# Patient Record
Sex: FEMALE | Race: WHITE | URBAN - METROPOLITAN AREA
[De-identification: names, ages, dates, MRNs, and addresses within clinical notes are randomized per-mention and may not be internally consistent; named-entity substitution may affect disease eponyms.]

---

## 2019-10-28 ENCOUNTER — EMERGENCY (EMERGENCY)
Facility: HOSPITAL | Age: 39
LOS: 0 days | Discharge: HOME | End: 2019-10-28
Attending: EMERGENCY MEDICINE | Admitting: EMERGENCY MEDICINE
Payer: MEDICAID

## 2019-10-28 VITALS
OXYGEN SATURATION: 98 % | DIASTOLIC BLOOD PRESSURE: 62 MMHG | TEMPERATURE: 99 F | HEART RATE: 84 BPM | RESPIRATION RATE: 18 BRPM | SYSTOLIC BLOOD PRESSURE: 111 MMHG

## 2019-10-28 VITALS
HEIGHT: 64 IN | HEART RATE: 89 BPM | SYSTOLIC BLOOD PRESSURE: 116 MMHG | TEMPERATURE: 100 F | WEIGHT: 229.94 LBS | RESPIRATION RATE: 20 BRPM | DIASTOLIC BLOOD PRESSURE: 61 MMHG | OXYGEN SATURATION: 97 %

## 2019-10-28 DIAGNOSIS — R05 COUGH: ICD-10-CM

## 2019-10-28 DIAGNOSIS — B34.9 VIRAL INFECTION, UNSPECIFIED: ICD-10-CM

## 2019-10-28 LAB
ALBUMIN SERPL ELPH-MCNC: 4.3 G/DL — SIGNIFICANT CHANGE UP (ref 3.5–5.2)
ALP SERPL-CCNC: 120 U/L — HIGH (ref 30–115)
ALT FLD-CCNC: 25 U/L — SIGNIFICANT CHANGE UP (ref 0–41)
ANION GAP SERPL CALC-SCNC: 14 MMOL/L — SIGNIFICANT CHANGE UP (ref 7–14)
AST SERPL-CCNC: 15 U/L — SIGNIFICANT CHANGE UP (ref 0–41)
BASOPHILS # BLD AUTO: 0.04 K/UL — SIGNIFICANT CHANGE UP (ref 0–0.2)
BASOPHILS NFR BLD AUTO: 0.3 % — SIGNIFICANT CHANGE UP (ref 0–1)
BILIRUB SERPL-MCNC: 2 MG/DL — HIGH (ref 0.2–1.2)
BUN SERPL-MCNC: 15 MG/DL — SIGNIFICANT CHANGE UP (ref 10–20)
CALCIUM SERPL-MCNC: 9.2 MG/DL — SIGNIFICANT CHANGE UP (ref 8.5–10.1)
CHLORIDE SERPL-SCNC: 99 MMOL/L — SIGNIFICANT CHANGE UP (ref 98–110)
CO2 SERPL-SCNC: 26 MMOL/L — SIGNIFICANT CHANGE UP (ref 17–32)
CREAT SERPL-MCNC: 0.9 MG/DL — SIGNIFICANT CHANGE UP (ref 0.7–1.5)
EOSINOPHIL # BLD AUTO: 0.01 K/UL — SIGNIFICANT CHANGE UP (ref 0–0.7)
EOSINOPHIL NFR BLD AUTO: 0.1 % — SIGNIFICANT CHANGE UP (ref 0–8)
GLUCOSE SERPL-MCNC: 131 MG/DL — HIGH (ref 70–99)
HCT VFR BLD CALC: 43.7 % — SIGNIFICANT CHANGE UP (ref 37–47)
HGB BLD-MCNC: 14.8 G/DL — SIGNIFICANT CHANGE UP (ref 12–16)
IMM GRANULOCYTES NFR BLD AUTO: 0.6 % — HIGH (ref 0.1–0.3)
LYMPHOCYTES # BLD AUTO: 1.35 K/UL — SIGNIFICANT CHANGE UP (ref 1.2–3.4)
LYMPHOCYTES # BLD AUTO: 9.2 % — LOW (ref 20.5–51.1)
MCHC RBC-ENTMCNC: 29.6 PG — SIGNIFICANT CHANGE UP (ref 27–31)
MCHC RBC-ENTMCNC: 33.9 G/DL — SIGNIFICANT CHANGE UP (ref 32–37)
MCV RBC AUTO: 87.4 FL — SIGNIFICANT CHANGE UP (ref 81–99)
MONOCYTES # BLD AUTO: 1.4 K/UL — HIGH (ref 0.1–0.6)
MONOCYTES NFR BLD AUTO: 9.6 % — HIGH (ref 1.7–9.3)
NEUTROPHILS # BLD AUTO: 11.74 K/UL — HIGH (ref 1.4–6.5)
NEUTROPHILS NFR BLD AUTO: 80.2 % — HIGH (ref 42.2–75.2)
NRBC # BLD: 0 /100 WBCS — SIGNIFICANT CHANGE UP (ref 0–0)
PLATELET # BLD AUTO: 179 K/UL — SIGNIFICANT CHANGE UP (ref 130–400)
POTASSIUM SERPL-MCNC: 4 MMOL/L — SIGNIFICANT CHANGE UP (ref 3.5–5)
POTASSIUM SERPL-SCNC: 4 MMOL/L — SIGNIFICANT CHANGE UP (ref 3.5–5)
PROT SERPL-MCNC: 7.5 G/DL — SIGNIFICANT CHANGE UP (ref 6–8)
RBC # BLD: 5 M/UL — SIGNIFICANT CHANGE UP (ref 4.2–5.4)
RBC # FLD: 12.1 % — SIGNIFICANT CHANGE UP (ref 11.5–14.5)
SODIUM SERPL-SCNC: 139 MMOL/L — SIGNIFICANT CHANGE UP (ref 135–146)
WBC # BLD: 14.63 K/UL — HIGH (ref 4.8–10.8)
WBC # FLD AUTO: 14.63 K/UL — HIGH (ref 4.8–10.8)

## 2019-10-28 PROCEDURE — 99284 EMERGENCY DEPT VISIT MOD MDM: CPT

## 2019-10-28 PROCEDURE — 71046 X-RAY EXAM CHEST 2 VIEWS: CPT | Mod: 26

## 2019-10-28 RX ORDER — SODIUM CHLORIDE 9 MG/ML
1000 INJECTION, SOLUTION INTRAVENOUS ONCE
Refills: 0 | Status: COMPLETED | OUTPATIENT
Start: 2019-10-28 | End: 2019-10-28

## 2019-10-28 RX ORDER — KETOROLAC TROMETHAMINE 30 MG/ML
15 SYRINGE (ML) INJECTION ONCE
Refills: 0 | Status: DISCONTINUED | OUTPATIENT
Start: 2019-10-28 | End: 2019-10-28

## 2019-10-28 RX ADMIN — SODIUM CHLORIDE 1000 MILLILITER(S): 9 INJECTION, SOLUTION INTRAVENOUS at 09:28

## 2019-10-28 RX ADMIN — Medication 15 MILLIGRAM(S): at 09:27

## 2019-10-28 NOTE — ED PROVIDER NOTE - ATTENDING CONTRIBUTION TO CARE
cough and fever.  VS noted.  Chest clear.  Heart RR no murmur.  abd NT.  Ext FROM.  =pulses. dx testing reviewed.

## 2019-10-28 NOTE — ED PROVIDER NOTE - NS ED ROS FT
Constitutional: (-) fever, (+) chills, (+) myalgias   Eyes: (-) visual changes  ENT: (-) nasal congestions  Cardiovascular: (-) chest pain, (-) syncope  Respiratory: (+) cough, (-) shortness of breath, (-) dyspnea,   Gastrointestinal: (-) vomiting, (-) diarrhea, (-)nausea,  Musculoskeletal: (-) neck pain, (-) back pain, (-) joint pain,  Integumentary: (-) rash, (-) edema, (-) bruises  Neurological: (-) headache, (-) loc, (-) dizziness, (-) tingling, (-)numbness,  Peripheral Vascular: (-) leg swelling  :  (-)dysuria,  (-) hematuria  Allergic/Immunologic: (-) pruritus

## 2019-10-28 NOTE — ED PROVIDER NOTE - PATIENT PORTAL LINK FT
You can access the FollowMyHealth Patient Portal offered by [facility] by registering at the following website: [email or web address]. By joining [employer]’s FollowMyHealth portal, you will also be able to view your health information using other applications (apps) compatible with our system.

## 2019-10-28 NOTE — ED PROVIDER NOTE - OBJECTIVE STATEMENT
38 yo female, no pmh, presents to ed for cough, chills, and diffuse muscle aches. Started 3 days ago, no otc meds for pain, mild, aching, no radiation of pain. Denies fever, cp, sob, dysuria, hematuria, neck pain, visual changes, ha.

## 2019-10-28 NOTE — ED PROVIDER NOTE - PHYSICAL EXAMINATION
Physical Exam    Vital Signs: I have reviewed the initial vital signs.  Constitutional: well-nourished, appears stated age, no acute distress  Eyes: Conjunctiva pink, Sclera clear,  Cardiovascular: S1 and S2, regular rate, regular rhythm, well-perfused extremities, radial pulses equal and 2+  Respiratory: unlabored respiratory effort, clear to auscultation bilaterally no wheezing, rales and rhonchi  Gastrointestinal: soft, non-tender abdomen, no pulsatile mass, normal bowl sounds  Musculoskeletal: supple neck, no lower extremity edema, no midline tenderness  Integumentary: warm, dry, no rash  Neurologic: awake, alert, nvi,  extremities’ motor and sensory functions grossly intact

## 2019-10-28 NOTE — ED PROVIDER NOTE - NSFOLLOWUPINSTRUCTIONS_ED_ALL_ED_FT
Viral Illness, Adult  Viruses are tiny germs that can get into a person's body and cause illness. There are many different types of viruses, and they cause many types of illness. Viral illnesses can range from mild to severe. They can affect various parts of the body.    Common illnesses that are caused by a virus include colds and the flu. Viral illnesses also include serious conditions such as HIV/AIDS (human immunodeficiency virus/acquired immunodeficiency syndrome). A few viruses have been linked to certain cancers.    What are the causes?  Many types of viruses can cause illness. Viruses invade cells in your body, multiply, and cause the infected cells to malfunction or die. When the cell dies, it releases more of the virus. When this happens, you develop symptoms of the illness, and the virus continues to spread to other cells. If the virus takes over the function of the cell, it can cause the cell to divide and grow out of control, as is the case when a virus causes cancer.    Different viruses get into the body in different ways. You can get a virus by:    Swallowing food or water that is contaminated with the virus.  Breathing in droplets that have been coughed or sneezed into the air by an infected person.  Touching a surface that has been contaminated with the virus and then touching your eyes, nose, or mouth.  Being bitten by an insect or animal that carries the virus.  Having sexual contact with a person who is infected with the virus.  Being exposed to blood or fluids that contain the virus, either through an open cut or during a transfusion.    If a virus enters your body, your body's defense system (immune system) will try to fight the virus. You may be at higher risk for a viral illness if your immune system is weak.    What are the signs or symptoms?  Symptoms vary depending on the type of virus and the location of the cells that it invades. Common symptoms of the main types of viral illnesses include:    Cold and flu viruses     Fever.  Headache.  Sore throat.  Muscle aches.  Nasal congestion.  Cough.  Digestive system (gastrointestinal) viruses     Fever.  Abdominal pain.  Nausea.  Diarrhea.  Liver viruses (hepatitis)     Loss of appetite.  Tiredness.  Yellowing of the skin (jaundice).  Brain and spinal cord viruses     Fever.  Headache.  Stiff neck.  Nausea and vomiting.  Confusion or sleepiness.  Skin viruses     Warts.  Itching.  Rash.  Sexually transmitted viruses     Discharge.  Swelling.  Redness.  Rash.  How is this treated?  Viruses can be difficult to treat because they live within cells. Antibiotic medicines do not treat viruses because these drugs do not get inside cells. Treatment for a viral illness may include:    Resting and drinking plenty of fluids.  Medicines to relieve symptoms. These can include over-the-counter medicine for pain and fever, medicines for cough or congestion, and medicines to relieve diarrhea.  Antiviral medicines. These drugs are available only for certain types of viruses. They may help reduce flu symptoms if taken early. There are also many antiviral medicines for hepatitis and HIV/AIDS.    Some viral illnesses can be prevented with vaccinations. A common example is the flu shot.    Follow these instructions at home:  Medicines     Image   Take over-the-counter and prescription medicines only as told by your health care provider.  If you were prescribed an antiviral medicine, take it as told by your health care provider. Do not stop taking the medicine even if you start to feel better.  Be aware of when antibiotics are needed and when they are not needed. Antibiotics do not treat viruses. If your health care provider thinks that you may have a bacterial infection as well as a viral infection, you may get an antibiotic.    Do not ask for an antibiotic prescription if you have been diagnosed with a viral illness. That will not make your illness go away faster.  Frequently taking antibiotics when they are not needed can lead to antibiotic resistance. When this develops, the medicine no longer works against the bacteria that it normally fights.    General instructions     Drink enough fluids to keep your urine clear or pale yellow.  Rest as much as possible.  Return to your normal activities as told by your health care provider. Ask your health care provider what activities are safe for you.  Keep all follow-up visits as told by your health care provider. This is important.  How is this prevented?  ImageTake these actions to reduce your risk of viral infection:    Eat a healthy diet and get enough rest.  Wash your hands often with soap and water. This is especially important when you are in public places. If soap and water are not available, use hand .  Avoid close contact with friends and family who have a viral illness.  If you travel to areas where viral gastrointestinal infection is common, avoid drinking water or eating raw food.  Keep your immunizations up to date. Get a flu shot every year as told by your health care provider.  Do not share toothbrushes, nail clippers, razors, or needles with other people.  Always practice safe sex.    Contact a health care provider if:  You have symptoms of a viral illness that do not go away.  Your symptoms come back after going away.  Your symptoms get worse.  Get help right away if:  You have trouble breathing.  You have a severe headache or a stiff neck.  You have severe vomiting or abdominal pain.  This information is not intended to replace advice given to you by your health care provider. Make sure you discuss any questions you have with your health care provider.

## 2021-01-29 ENCOUNTER — OUTPATIENT (OUTPATIENT)
Dept: OUTPATIENT SERVICES | Facility: HOSPITAL | Age: 41
LOS: 1 days | Discharge: HOME | End: 2021-01-29
Payer: MEDICAID

## 2021-01-29 DIAGNOSIS — M79.641 PAIN IN RIGHT HAND: ICD-10-CM

## 2021-01-29 DIAGNOSIS — M25.561 PAIN IN RIGHT KNEE: ICD-10-CM

## 2021-01-29 DIAGNOSIS — M79.644 PAIN IN RIGHT FINGER(S): ICD-10-CM

## 2021-01-29 PROBLEM — Z78.9 OTHER SPECIFIED HEALTH STATUS: Chronic | Status: ACTIVE | Noted: 2019-10-28

## 2021-01-29 PROCEDURE — 73562 X-RAY EXAM OF KNEE 3: CPT | Mod: 26,RT

## 2021-01-29 PROCEDURE — 73130 X-RAY EXAM OF HAND: CPT | Mod: 26,RT

## 2021-12-15 ENCOUNTER — OUTPATIENT (OUTPATIENT)
Dept: OUTPATIENT SERVICES | Facility: HOSPITAL | Age: 41
LOS: 1 days | Discharge: HOME | End: 2021-12-15
Payer: MEDICAID

## 2021-12-15 DIAGNOSIS — M54.9 DORSALGIA, UNSPECIFIED: ICD-10-CM

## 2021-12-15 DIAGNOSIS — M25.50 PAIN IN UNSPECIFIED JOINT: ICD-10-CM

## 2021-12-15 PROCEDURE — 73090 X-RAY EXAM OF FOREARM: CPT | Mod: 26,LT

## 2021-12-15 PROCEDURE — 73552 X-RAY EXAM OF FEMUR 2/>: CPT | Mod: 26,LT

## 2021-12-15 PROCEDURE — 73502 X-RAY EXAM HIP UNI 2-3 VIEWS: CPT | Mod: 26,LT

## 2021-12-15 PROCEDURE — 73110 X-RAY EXAM OF WRIST: CPT | Mod: 26,LT

## 2021-12-15 PROCEDURE — 73060 X-RAY EXAM OF HUMERUS: CPT | Mod: 26,LT

## 2021-12-15 PROCEDURE — 73080 X-RAY EXAM OF ELBOW: CPT | Mod: 26,LT

## 2021-12-15 PROCEDURE — 72110 X-RAY EXAM L-2 SPINE 4/>VWS: CPT | Mod: 26

## 2022-10-04 ENCOUNTER — ASOB RESULT (OUTPATIENT)
Age: 42
End: 2022-10-04

## 2022-10-04 ENCOUNTER — APPOINTMENT (OUTPATIENT)
Dept: ANTEPARTUM | Facility: CLINIC | Age: 42
End: 2022-10-04

## 2022-10-04 VITALS
SYSTOLIC BLOOD PRESSURE: 130 MMHG | HEART RATE: 91 BPM | BODY MASS INDEX: 44.39 KG/M2 | WEIGHT: 260 LBS | HEIGHT: 64 IN | DIASTOLIC BLOOD PRESSURE: 80 MMHG

## 2022-10-04 PROCEDURE — 76813 OB US NUCHAL MEAS 1 GEST: CPT

## 2022-11-29 ENCOUNTER — ASOB RESULT (OUTPATIENT)
Age: 42
End: 2022-11-29

## 2022-11-29 ENCOUNTER — APPOINTMENT (OUTPATIENT)
Dept: ANTEPARTUM | Facility: CLINIC | Age: 42
End: 2022-11-29

## 2022-11-29 VITALS
HEART RATE: 86 BPM | SYSTOLIC BLOOD PRESSURE: 122 MMHG | DIASTOLIC BLOOD PRESSURE: 80 MMHG | WEIGHT: 265 LBS | BODY MASS INDEX: 45.24 KG/M2 | HEIGHT: 64 IN

## 2022-11-29 PROCEDURE — 76811 OB US DETAILED SNGL FETUS: CPT

## 2022-11-29 PROCEDURE — 76817 TRANSVAGINAL US OBSTETRIC: CPT

## 2023-02-02 ENCOUNTER — APPOINTMENT (OUTPATIENT)
Dept: ANTEPARTUM | Facility: CLINIC | Age: 43
End: 2023-02-02
Payer: MEDICAID

## 2023-02-02 ENCOUNTER — ASOB RESULT (OUTPATIENT)
Age: 43
End: 2023-02-02

## 2023-02-02 ENCOUNTER — NON-APPOINTMENT (OUTPATIENT)
Age: 43
End: 2023-02-02

## 2023-02-02 VITALS
DIASTOLIC BLOOD PRESSURE: 81 MMHG | HEART RATE: 103 BPM | BODY MASS INDEX: 46.44 KG/M2 | HEIGHT: 64 IN | SYSTOLIC BLOOD PRESSURE: 118 MMHG | WEIGHT: 272 LBS

## 2023-02-02 PROCEDURE — 76816 OB US FOLLOW-UP PER FETUS: CPT

## 2023-03-02 ENCOUNTER — APPOINTMENT (OUTPATIENT)
Dept: MATERNAL FETAL MEDICINE | Facility: CLINIC | Age: 43
End: 2023-03-02
Payer: MEDICAID

## 2023-03-02 ENCOUNTER — APPOINTMENT (OUTPATIENT)
Dept: ANTEPARTUM | Facility: CLINIC | Age: 43
End: 2023-03-02
Payer: MEDICAID

## 2023-03-02 ENCOUNTER — ASOB RESULT (OUTPATIENT)
Age: 43
End: 2023-03-02

## 2023-03-02 VITALS
DIASTOLIC BLOOD PRESSURE: 70 MMHG | HEIGHT: 64 IN | WEIGHT: 276 LBS | BODY MASS INDEX: 47.12 KG/M2 | HEART RATE: 98 BPM | SYSTOLIC BLOOD PRESSURE: 120 MMHG

## 2023-03-02 PROCEDURE — 99214 OFFICE O/P EST MOD 30 MIN: CPT | Mod: 25

## 2023-03-02 PROCEDURE — 76819 FETAL BIOPHYS PROFIL W/O NST: CPT

## 2023-03-09 ENCOUNTER — APPOINTMENT (OUTPATIENT)
Dept: ANTEPARTUM | Facility: CLINIC | Age: 43
End: 2023-03-09
Payer: MEDICAID

## 2023-03-09 ENCOUNTER — ASOB RESULT (OUTPATIENT)
Age: 43
End: 2023-03-09

## 2023-03-09 ENCOUNTER — APPOINTMENT (OUTPATIENT)
Dept: MATERNAL FETAL MEDICINE | Facility: CLINIC | Age: 43
End: 2023-03-09
Payer: MEDICAID

## 2023-03-09 VITALS
WEIGHT: 274 LBS | HEIGHT: 64 IN | HEART RATE: 80 BPM | BODY MASS INDEX: 46.78 KG/M2 | DIASTOLIC BLOOD PRESSURE: 80 MMHG | SYSTOLIC BLOOD PRESSURE: 121 MMHG

## 2023-03-09 PROCEDURE — 76819 FETAL BIOPHYS PROFIL W/O NST: CPT

## 2023-03-09 PROCEDURE — ZZZZZ: CPT

## 2023-03-11 NOTE — DISCUSSION/SUMMARY
[FreeTextEntry1] : 42y  at 35 +0/7 weeks GA referred by Dr Pressley with recent Dx of GDM and plantar itching. \par Denies any vaginal bleeding or leaking or decreased fetal movements. \par Denies any itching at this time in palms or soles. \par \par Px: \par VS: 121/80; P 80, Wt: 274 lbs gained 2 lbs since last month.\par Abd: Fundus sot non tender.\par No calves pain,  +1 pitting edema. \par \par Impression: 42  at 35 weeks GA. \par 1. GDM: \par - Reviewed BG log from  to :  \par - Fastin-97 91/7 above 95)\par - PP:  (0/18 > 120)\par - A1c 5.0%\par - Continue with medical nutritional therapy.\par \par 2. LGA fetus with polyhydramnios:\par - Today BPP 8/8, and CHARMAINE of 27. \par - Continue weekly fetal surveillance. \par \par 3. History of plantar itching.\par - Bile acids are within normal limits. However ALT of 96 and AST is 38. Consider repeating LFTs and hepatitis profile. \par - Patient counseled on ICP. \par - Would not go beyond 38 weeks in terms of delivery timing given the suspicion of ICP.\par \par 4. Increased risk of hypertensive complications of pregnancy:\par - Baseline preeclamptic labs reviewed and P/C ratio is 0.3. \par \par 5. Pregnancy care with Dr. Pressley. Patient has an appointment with him later today. \par  \par Thank you for allowing us to be part of Ms Epps's pregnancy care team. \par

## 2023-03-15 ENCOUNTER — APPOINTMENT (OUTPATIENT)
Dept: ANTEPARTUM | Facility: CLINIC | Age: 43
End: 2023-03-15
Payer: MEDICAID

## 2023-03-15 ENCOUNTER — APPOINTMENT (OUTPATIENT)
Dept: MATERNAL FETAL MEDICINE | Facility: CLINIC | Age: 43
End: 2023-03-15
Payer: MEDICAID

## 2023-03-15 ENCOUNTER — ASOB RESULT (OUTPATIENT)
Age: 43
End: 2023-03-15

## 2023-03-15 VITALS
DIASTOLIC BLOOD PRESSURE: 78 MMHG | SYSTOLIC BLOOD PRESSURE: 118 MMHG | BODY MASS INDEX: 46.44 KG/M2 | WEIGHT: 272 LBS | HEART RATE: 96 BPM | HEIGHT: 64 IN

## 2023-03-15 PROCEDURE — 99215 OFFICE O/P EST HI 40 MIN: CPT | Mod: 25

## 2023-03-15 PROCEDURE — 76819 FETAL BIOPHYS PROFIL W/O NST: CPT

## 2023-03-15 NOTE — DISCUSSION/SUMMARY
[FreeTextEntry1] : 42 year old   4 para 3 0 0 3 at 35 weeks and 6 days gestational age referred by Dr Pressley with recent diagnosis of gestational diabetes mellitus, plantar itching and elevated liver function tests. Bile acids, complete blood count, and hemoglobin A 1 c were normal.\par She feels well and the baby is active.\par Denies any itching at this time.\par \par . \par Blood pressure is 118/78 and she has lost 2 pounds.\par Abdomen soft and not tender.\par No edema noted.\par \par Review of blood sugar log shows good control on diet.\par Repeat liver enzymes declining.\par Fetus is large for gestational age and polyhydramnios is noted\par Biophysical profile is 8/8 today\par \par Continue home glucose monitoring.\par Weekly biophysical profile until delivery.  \par \par Labor will be induced with Dr. Pressley. \par  \par Thank you for allowing us to be part of Ms Epps's pregnancy care team. \par \par  \par

## 2023-03-23 ENCOUNTER — ASOB RESULT (OUTPATIENT)
Age: 43
End: 2023-03-23

## 2023-03-23 ENCOUNTER — APPOINTMENT (OUTPATIENT)
Dept: ANTEPARTUM | Facility: CLINIC | Age: 43
End: 2023-03-23
Payer: MEDICAID

## 2023-03-23 ENCOUNTER — APPOINTMENT (OUTPATIENT)
Dept: MATERNAL FETAL MEDICINE | Facility: CLINIC | Age: 43
End: 2023-03-23
Payer: MEDICAID

## 2023-03-23 PROCEDURE — 76816 OB US FOLLOW-UP PER FETUS: CPT

## 2023-03-23 PROCEDURE — 76819 FETAL BIOPHYS PROFIL W/O NST: CPT | Mod: 59

## 2023-03-23 PROCEDURE — 99213 OFFICE O/P EST LOW 20 MIN: CPT | Mod: 25

## 2023-03-23 NOTE — DISCUSSION/SUMMARY
[FreeTextEntry1] : 42y  at 37 +0/7 weeks GA referred by Dr Pressley with recent Dx of GDM and prior history of itching.\par Denies any vaginal bleeding or leaking or contractions or decreased fetal movements. \par Denies any itching at this time.\par \par Px: \par VS: 118/78; P 96, Wt: 272 lbs lost 2 lbs since last visit. \par Abd: Fundus sot non tender.\par No calves pain, no LLE.\par \par Impression: 42  at 37 weeks GA. \par 1. GDM: \par - Reviewed BG log from  to : \par - Fastin-102 ( above 95)\par - PP:  ( > 120)\par - Continue with medical nutritional therapy.\par \par 2. LGA fetus with polyhydramnios.\par - Today BPP 8/8, and CHARMAINE of 28.\par - Continue weekly fetal surveillance. \par \par 3. History of plantar itching.\par - Bile acids are within normal limits. LFTs were 96/38, repeated .\par - Will repeat LFTs today.\par - Patient previously counseled on ICP. \par \par 4. Increased risk of hypertensive complications of pregnancy:\par - Baseline preeclamptic labs reviewed and P/C ratio is 0.3. \par - Urine dip today is negative for protein. Will repeat P/C ratio. \par - Home BP monitoring recommended, ordered BP device. \par - Preeclamptic precautions given.\par \par Would not go beyond 39 weeks of gestation unless otherwise indicated. \par 5. Pregnancy care with Dr. Pressley. Patient has an appointment with him later today. \par  \par Thank you for allowing us to be part of Ms Epps's pregnancy care team. \par

## 2023-03-26 ENCOUNTER — EMERGENCY (EMERGENCY)
Facility: HOSPITAL | Age: 43
LOS: 0 days | Discharge: HOME | End: 2023-03-27
Attending: EMERGENCY MEDICINE
Payer: MEDICAID

## 2023-03-26 VITALS
DIASTOLIC BLOOD PRESSURE: 75 MMHG | HEART RATE: 81 BPM | SYSTOLIC BLOOD PRESSURE: 145 MMHG | OXYGEN SATURATION: 98 % | RESPIRATION RATE: 18 BRPM | TEMPERATURE: 98 F

## 2023-03-26 DIAGNOSIS — K59.00 CONSTIPATION, UNSPECIFIED: ICD-10-CM

## 2023-03-26 DIAGNOSIS — O99.613 DISEASES OF THE DIGESTIVE SYSTEM COMPLICATING PREGNANCY, THIRD TRIMESTER: ICD-10-CM

## 2023-03-26 DIAGNOSIS — Z3A.36 36 WEEKS GESTATION OF PREGNANCY: ICD-10-CM

## 2023-03-26 PROCEDURE — 99283 EMERGENCY DEPT VISIT LOW MDM: CPT

## 2023-03-26 PROCEDURE — 99284 EMERGENCY DEPT VISIT MOD MDM: CPT

## 2023-03-27 LAB
ALBUMIN SERPL ELPH-MCNC: 3.7 G/DL
ALP BLD-CCNC: 211 U/L
ALT SERPL-CCNC: 43 U/L
ANION GAP SERPL CALC-SCNC: 14 MMOL/L
AST SERPL-CCNC: 22 U/L
BILIRUB SERPL-MCNC: 1 MG/DL
BUN SERPL-MCNC: 12 MG/DL
CALCIUM SERPL-MCNC: 9.1 MG/DL
CHLORIDE SERPL-SCNC: 100 MMOL/L
CO2 SERPL-SCNC: 22 MMOL/L
CREAT SERPL-MCNC: 0.7 MG/DL
CREAT SPEC-SCNC: 135 MG/DL
CREAT/PROT UR: 0.2 RATIO
EGFR: 111 ML/MIN/1.73M2
GLUCOSE SERPL-MCNC: 93 MG/DL
POTASSIUM SERPL-SCNC: 3.9 MMOL/L
PROT SERPL-MCNC: 6.1 G/DL
PROT UR-MCNC: 23 MG/DLG/24H
SODIUM SERPL-SCNC: 136 MMOL/L

## 2023-03-27 RX ORDER — BLOOD-GLUCOSE METER
KIT MISCELLANEOUS
Qty: 1 | Refills: 0 | Status: ACTIVE | COMMUNITY
Start: 2023-03-23 | End: 1900-01-01

## 2023-03-27 NOTE — ED PROVIDER NOTE - CARE PROVIDER_API CALL
Ady Pressley)  Obstetrics and Gynecology  [address] [place] [address]  Phone: [phone]  Fax: [phone]  Follow Up Time: 1-3 Days

## 2023-03-27 NOTE — ED PROVIDER NOTE - PHYSICAL EXAMINATION
VITAL SIGNS: I have reviewed nursing notes and confirm.  CONSTITUTIONAL: Well-developed; well-nourished; in no acute distress.  SKIN: Skin exam is warm and dry, no acute rash.  HEAD: Normocephalic; atraumatic.  EYES: Conjunctiva and sclera clear.  ENT: No nasal discharge; airway clear.  CARD: S1, S2 normal; no murmurs, gallops, or rubs. Regular rate and rhythm.  RESP: No wheezes, rales or rhonchi. Speaking in full sentences.   ABD: Normal bowel sounds; Gravid, non-tender. No CVA TTP.  EXT: Normal ROM. No clubbing, cyanosis or edema.  NEURO: Alert, oriented. Grossly unremarkable. No focal deficits.

## 2023-03-27 NOTE — ED PROVIDER NOTE - PATIENT PORTAL LINK FT
You can access the FollowMyHealth Patient Portal offered by [facility] by registering at the following website: [email or web address]. By joining [employer]’s FollowMyHealth portal, you will also be able to view your health information using other applications (apps) compatible with our system.

## 2023-03-27 NOTE — ED PROVIDER NOTE - OBJECTIVE STATEMENT
42-year-old female , currently 9 months pregnant, without any significant past medical history presents to the ED complaining of constipation.  Patient states she has not been able to have a bowel movement over the last 2 days.  She tried using an enema prior to arrival without any relief of symptoms.  She denies other complaints. Pt denies fever, chills, nausea, vomiting, abdominal pain, diarrhea, headache, chest pain, SOB, back pain, LOC, trauma, urinary symptoms.

## 2023-03-27 NOTE — ED PROVIDER NOTE - NS ED ATTENDING STATEMENT MOD
I have seen and examined this patient and fully participated in the care of this patient as the teaching attending.  The service was shared with the OPAL.  I reviewed and verified the documentation and independently performed the documented:

## 2023-03-27 NOTE — ED PROVIDER NOTE - CLINICAL SUMMARY MEDICAL DECISION MAKING FREE TEXT BOX
43-year-old female G4, P3, 9 months pregnant, presents with constipation for 2 days.  Took an enema prior to arrival.  Abdomen benign.  Patient had a bowel movement while in the ED with relief of symptoms.  Will DC to follow-up with private OB.

## 2023-03-27 NOTE — ED PROVIDER NOTE - ATTENDING CONTRIBUTION TO CARE
42-year-old female G4, P3, 9 months pregnant, presents with constipation for 2 days.  No abdominal pain or vomiting.  Denies vaginal bleeding.  Took her own enema prior to arrival.  Exam shows alert patient in no distress, HEENT NCAT PERRL, lungs clear, RR S1S2, abdomen soft gravid uterus +BS, no CCE.

## 2023-03-29 ENCOUNTER — APPOINTMENT (OUTPATIENT)
Dept: ANTEPARTUM | Facility: CLINIC | Age: 43
End: 2023-03-29
Payer: MEDICAID

## 2023-03-29 ENCOUNTER — APPOINTMENT (OUTPATIENT)
Dept: MATERNAL FETAL MEDICINE | Facility: CLINIC | Age: 43
End: 2023-03-29
Payer: MEDICAID

## 2023-03-29 ENCOUNTER — ASOB RESULT (OUTPATIENT)
Age: 43
End: 2023-03-29

## 2023-03-29 VITALS
DIASTOLIC BLOOD PRESSURE: 76 MMHG | HEIGHT: 64 IN | HEART RATE: 100 BPM | SYSTOLIC BLOOD PRESSURE: 120 MMHG | WEIGHT: 275 LBS | BODY MASS INDEX: 46.95 KG/M2

## 2023-03-29 PROCEDURE — 76819 FETAL BIOPHYS PROFIL W/O NST: CPT

## 2023-03-29 PROCEDURE — ZZZZZ: CPT

## 2023-03-29 NOTE — DISCUSSION/SUMMARY
[FreeTextEntry1] : Ms. Epps is a 42 year old  at 37 weeks 6 days referred by Dr Pressley with recent Dx of GDM and prior history of itching.\par Denies any vaginal bleeding or leaking or contractions or decreased fetal movements. \par Denies any itching at this time.\par \par Px: \par VS: 120/76; P 100, Wt: 272 lbs \par Abd: Fundus sot non tender.\par Extremities:  nontender calves\par \par Impression: 42 year old  at 37 weeks 6 days. \par 1. GDM: \par - Reviewed BG log from  to : \par - Fastin-103\par - 2 hours after breakfast: \par - 2 hours after lunch:  \par - 2 hours after dinner:  \par -  Walking after meals encouraged.\par \par 2. LGA fetus with polyhydramnios.\par - Today BPP 10/10, and CHARMAINE of 28.\par - Continue weekly fetal surveillance. \par \par 3. History of plantar itching.\par - Bile acids are within normal limits.AST/ALT were 38/96, repeat was \par - Patient previously counseled on ICP. \par \par 4. Increased risk of hypertensive complications of pregnancy:\par - Urine protein:creatinine ratio was 0.3, repeat was 0.2.\par - Monitor and record daily blood pressures (We spoke with the pharmacy to ensure that she would be able to obtain a cuff.)\par \par \par Prenatal care is with Dr. Pressley\par Fetal movement and labor precautions were discussed.\par Weekly biophysical profiles with non stress tests.\par Follow up in one week.\par \par Leonid Rios MD\par \par

## 2023-04-04 ENCOUNTER — APPOINTMENT (OUTPATIENT)
Dept: ANTEPARTUM | Facility: CLINIC | Age: 43
End: 2023-04-04
Payer: MEDICAID

## 2023-04-04 ENCOUNTER — APPOINTMENT (OUTPATIENT)
Dept: MATERNAL FETAL MEDICINE | Facility: CLINIC | Age: 43
End: 2023-04-04
Payer: MEDICAID

## 2023-04-04 ENCOUNTER — ASOB RESULT (OUTPATIENT)
Age: 43
End: 2023-04-04

## 2023-04-04 VITALS
DIASTOLIC BLOOD PRESSURE: 80 MMHG | BODY MASS INDEX: 47.63 KG/M2 | SYSTOLIC BLOOD PRESSURE: 121 MMHG | HEIGHT: 64 IN | HEART RATE: 96 BPM | WEIGHT: 279 LBS

## 2023-04-04 DIAGNOSIS — O24.419 GESTATIONAL DIABETES MELLITUS IN PREGNANCY, UNSPECIFIED CONTROL: ICD-10-CM

## 2023-04-04 DIAGNOSIS — O09.529 SUPERVISION OF ELDERLY MULTIGRAVIDA, UNSPECIFIED TRIMESTER: ICD-10-CM

## 2023-04-04 DIAGNOSIS — O99.210 OBESITY COMPLICATING PREGNANCY, UNSPECIFIED TRIMESTER: ICD-10-CM

## 2023-04-04 DIAGNOSIS — R74.8 ABNORMAL LEVELS OF OTHER SERUM ENZYMES: ICD-10-CM

## 2023-04-04 PROCEDURE — 76818 FETAL BIOPHYS PROFILE W/NST: CPT

## 2023-04-04 PROCEDURE — ZZZZZ: CPT

## 2023-04-04 PROCEDURE — 99213 OFFICE O/P EST LOW 20 MIN: CPT | Mod: 25

## 2023-04-04 NOTE — DISCUSSION/SUMMARY
[FreeTextEntry1] : 42 year old  4 para 3 0 0 3 at 38 weeks and 5 days gestational age referred by Dr Pressley with gestational diabetes mellitus.\par She feels well and the baby is active.\par \par \par . \par Abdomen soft and not tender.\par No edema noted.\par \par Review of blood sugar log shows good control on diet.\par \par Fetus is large for gestational age and polyhydramnios is noted\par Biophysical profile is 10/10 today\par \par \par \par Labor will be induced with Dr. Pressley Thursday or Friday\par

## 2023-04-21 LAB
ALBUMIN SERPL ELPH-MCNC: 3.4 G/DL
ALP BLD-CCNC: 172 U/L
ALT SERPL-CCNC: 96 U/L
ANION GAP SERPL CALC-SCNC: 13 MMOL/L
AST SERPL-CCNC: 38 U/L
BASOPHILS # BLD AUTO: 0.03 K/UL
BASOPHILS NFR BLD AUTO: 0.3 %
BILE AC SER-MCNC: 4.5 UMOL/L
BILEACIDS T: 3.3 UMOL/L
BILIRUB SERPL-MCNC: 1.1 MG/DL
BUN SERPL-MCNC: 7 MG/DL
CALCIUM SERPL-MCNC: 9 MG/DL
CHENDEOXYCHOLIC ACID: 0.5 UMOL/L
CHLORIDE SERPL-SCNC: 103 MMOL/L
CHOLIC ACID: 1 UMOL/L
CO2 SERPL-SCNC: 20 MMOL/L
CREAT SERPL-MCNC: 0.7 MG/DL
CREAT SPEC-SCNC: 167 MG/DL
CREAT/PROT UR: 0.3 RATIO
DEOXYCHOLIC ACID: 1.8 UMOL/L
EGFR: 111 ML/MIN/1.73M2
EOSINOPHIL # BLD AUTO: 0.09 K/UL
EOSINOPHIL NFR BLD AUTO: 0.9 %
ESTIMATED AVERAGE GLUCOSE: 97 MG/DL
GLUCOSE SERPL-MCNC: 93 MG/DL
HBA1C MFR BLD HPLC: 5 %
HCT VFR BLD CALC: 42.5 %
HGB BLD-MCNC: 14.5 G/DL
IMM GRANULOCYTES NFR BLD AUTO: 1.5 %
LYMPHOCYTES # BLD AUTO: 1.77 K/UL
LYMPHOCYTES NFR BLD AUTO: 17.8 %
MAN DIFF?: NORMAL
MCHC RBC-ENTMCNC: 30.1 PG
MCHC RBC-ENTMCNC: 34.1 G/DL
MCV RBC AUTO: 88.2 FL
MONOCYTES # BLD AUTO: 0.81 K/UL
MONOCYTES NFR BLD AUTO: 8.1 %
NEUTROPHILS # BLD AUTO: 7.1 K/UL
NEUTROPHILS NFR BLD AUTO: 71.4 %
PLATELET # BLD AUTO: 181 K/UL
POTASSIUM SERPL-SCNC: 3.9 MMOL/L
PROT SERPL-MCNC: 6.1 G/DL
PROT UR-MCNC: 43 MG/DLG/24H
RBC # BLD: 4.82 M/UL
RBC # FLD: 14 %
SODIUM SERPL-SCNC: 136 MMOL/L
URSODEOXYCH: <0.1 UMOL/L
WBC # FLD AUTO: 9.95 K/UL

## 2023-07-16 NOTE — DISCUSSION/SUMMARY
[FreeTextEntry1] : 3/2/23\par MFM Att'g Consult Note:\par 42y  referred by Dr Pressley with recent Dx of GDM.  Pt states she is feeling well, has no medical problems.  She denies VB, LOF, LAP, contractions.  She perceives active fetal movements. Pt c/o "bad" itching on soles and heels bilat, only at night. \par PMHx: Denies previous DM.  No Chr Htn, No asthma. 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  Denies tobacco, EtOH and other subst use. \par OBHx:  ,  x3, no complications.  Does not remember weights. \par GynHx: Normal menses.  Normal Paps per pt. \par Vaccinations: Influenza: Declined\par                        CoVID: Declined\par                        Tdap: will discuss with Dr Pressley. \par Family Planning: Per Dr BAEZA. \par ROS: As above. \par \par Px: Slightly anxious, moves easily.\par VS: 120/70; 98; 5'4"; 276:  BMI 47\par HEENT: NC/AT\par Lungs: \par Cor:\par Abd: Fundus soft, NT.  FHR  139us.\par Extr: +1 pitting edema. \par \par Impression: 42  presents with recent Dx of GDM, LGA fetus, and polyhydramnios.  c/o Plantar itching at night. \par 1. GDM:  6d of BS: FBS  (/6 incr); 2hBr  (1/6 incr);  2hL  (1/6 incr); 2h  (/ incr)\par Other than FBS, all elevations appear to be diet related.  Pt will likely need pm Rx for incr FBS, however, FBS control is sometimes achieved with diet alone.  Dietary counseling done.  Need for fetal surveillance discussed. \par -->RN counselling now, and RT in one week for f/u consult. \par -->A1C ordered. \par 2. LGA fetus with polyhydramnios:  Need for fetal survaillance discussed. \par \par 3. Plantar itching at night is typical of cholestasis of pregnancy.  \par -->Bile acids ordered.  Even if normal, Dx of cholestasis may be made by clinical symptoms alone.  \par --> pt at next visit regarding possible cholestasis. \par -->Consider delivery  not later than 38w0d. \par \par 4. Increased risk of hypertensive complications of pregnancy:\par -->Baseline preecl labs, UPr/Cr ratio ordered. \par \par 5. Delivery Plan: Per Dr Pressley.  Given all of the above, early term delivery may be appropriate. \par \par Thank you for allowing us to be part of Ms Mich's pregnancy care team. \par \par MD Gualberto, FACOG\par cell [phone]\par \par

## 2023-12-24 ENCOUNTER — EMERGENCY (EMERGENCY)
Facility: HOSPITAL | Age: 43
LOS: 0 days | Discharge: HOME | End: 2023-12-24
Attending: EMERGENCY MEDICINE
Payer: MEDICAID

## 2023-12-24 VITALS
DIASTOLIC BLOOD PRESSURE: 75 MMHG | SYSTOLIC BLOOD PRESSURE: 126 MMHG | WEIGHT: 270.07 LBS | RESPIRATION RATE: 20 BRPM | TEMPERATURE: 98 F | OXYGEN SATURATION: 95 % | HEART RATE: 91 BPM

## 2023-12-24 DIAGNOSIS — H66.91 OTITIS MEDIA, UNSPECIFIED, RIGHT EAR: ICD-10-CM

## 2023-12-24 DIAGNOSIS — Z20.822 CONTACT WITH AND (SUSPECTED) EXPOSURE TO COVID-19: ICD-10-CM

## 2023-12-24 DIAGNOSIS — H92.01 OTALGIA, RIGHT EAR: ICD-10-CM

## 2023-12-24 DIAGNOSIS — R05.1 ACUTE COUGH: ICD-10-CM

## 2023-12-24 LAB
FLUAV AG NPH QL: SIGNIFICANT CHANGE UP
FLUAV AG NPH QL: SIGNIFICANT CHANGE UP
FLUBV AG NPH QL: SIGNIFICANT CHANGE UP
FLUBV AG NPH QL: SIGNIFICANT CHANGE UP
RSV RNA NPH QL NAA+NON-PROBE: DETECTED
RSV RNA NPH QL NAA+NON-PROBE: DETECTED
SARS-COV-2 RNA SPEC QL NAA+PROBE: SIGNIFICANT CHANGE UP
SARS-COV-2 RNA SPEC QL NAA+PROBE: SIGNIFICANT CHANGE UP

## 2023-12-24 PROCEDURE — 99283 EMERGENCY DEPT VISIT LOW MDM: CPT | Mod: 25

## 2023-12-24 PROCEDURE — 0241U: CPT

## 2023-12-24 PROCEDURE — 71046 X-RAY EXAM CHEST 2 VIEWS: CPT

## 2023-12-24 PROCEDURE — 99284 EMERGENCY DEPT VISIT MOD MDM: CPT

## 2023-12-24 PROCEDURE — 71046 X-RAY EXAM CHEST 2 VIEWS: CPT | Mod: 26

## 2023-12-24 NOTE — ED PROVIDER NOTE - PHYSICAL EXAMINATION
VITAL SIGNS: I have reviewed nursing notes and confirm.  CONSTITUTIONAL: Patient is in no acute distress.  SKIN: Skin exam is warm and dry, no acute rash.  HEAD: Normocephalic; atraumatic.  EYES: PERRL, EOM intact; conjunctiva and sclera clear.  ENT: No nasal discharge; airway clear. Erythema to right TM.  Left TM normal.  NECK: Supple; non tender.  CARD: S1, S2 normal; no murmurs, gallops, or rubs. Regular rate and rhythm.  RESP: Clear to auscultation bilaterally. No wheezes, rales or rhonchi.  ABD: Normal bowel sounds; soft; non-distended; non-tender.   EXT: Normal ROM. No edema.  LYMPH: No acute cervical adenopathy.  NEURO: Alert, oriented. Grossly unremarkable. No focal deficits.  PSYCH: Cooperative, appropriate.

## 2023-12-24 NOTE — ED PROVIDER NOTE - NSFOLLOWUPINSTRUCTIONS_ED_ALL_ED_FT
Otitis Media    Otitis media is redness, soreness, and inflammation of the middle ear. Otitis media may be caused by allergies or, most commonly, by infection. Often it occurs as a complication of the common cold. Symptoms may include earache, fever, ringing in your ears, leakage of fluid from ear, hearing changes. If you were prescribed an antibiotic medicine, finish it all even if you start to feel better.     SEEK IMMEDIATE MEDICAL CARE IF YOU HAVE THE FOLLOWING SYMPTOMS: pain that is not controlled with medicine, swelling/redness/pain around your ear, facial paralysis, tenderness of the bone behind your ear when you touch it, neck lump or neck stiffness.     Cough    Coughing is a reflex that clears your throat and your airways. Coughing helps to heal and protect your lungs. It is normal to cough occasionally, but a cough that happens with other symptoms or lasts a long time may be a sign of a condition that needs treatment. Coughing may be caused by infections, asthma or COPD, smoking, postnasal drip, gastroesophageal reflux, as well as other medical conditions. Take medicines only as instructed by your health care provider. Avoid anything that causes you to cough at work or at home including smoking.    SEEK IMMEDIATE MEDICAL CARE IF YOU HAVE THE FOLLOWING SYMPTOMS: coughing up blood, shortness of breath, rapid heart rate, chest pain, unexplained weight loss or night sweats.

## 2023-12-24 NOTE — ED ADULT NURSE NOTE - NSFALLUNIVINTERV_ED_ALL_ED
Bed/Stretcher in lowest position, wheels locked, appropriate side rails in place/Call bell, personal items and telephone in reach/Instruct patient to call for assistance before getting out of bed/chair/stretcher/Non-slip footwear applied when patient is off stretcher/[place] to call system/Physically safe environment - no spills, clutter or unnecessary equipment/Purposeful proactive rounding/Room/bathroom lighting operational, light cord in reach Bed/Stretcher in lowest position, wheels locked, appropriate side rails in place/Call bell, personal items and telephone in reach/Instruct patient to call for assistance before getting out of bed/chair/stretcher/Non-slip footwear applied when patient is off stretcher/[place] to call system/Physically safe environment - no spills, clutter or unnecessary equipment/Purposeful proactive rounding/Room/bathroom lighting operational, light cord in reach

## 2023-12-24 NOTE — ED PROVIDER NOTE - PATIENT PORTAL LINK FT
You can access the FollowMyHealth Patient Portal offered by [facility] by registering at the following website: [email or web address]. By joining [employer]’s FollowMyHealth portal, you will also be able to view your health information using other applications (apps) compatible with our system. You can access the FollowMyHealth Patient Portal offered by [facility] by registering at the following website: [email or web address]. By joining [employer]’s FollowMyHealth portal, you will also be able to view your health information using other applications (apps) compatible with our system.

## 2023-12-24 NOTE — ED PROVIDER NOTE - ATTENDING APP SHARED VISIT CONTRIBUTION OF CARE
One week of cough. Feels dryness of the throat. NO shortness of breath. Son had RSV one week ago. All her children are on antibiotics. NO chest pain. On exam no distress. S1S2 rrr, lungs clear, no edema lower  extremity. NO hx of asthma or tobacco use.

## 2023-12-24 NOTE — ED PROVIDER NOTE - OBJECTIVE STATEMENT
43-year-old female with no past medical history presented with cough and right ear pain x 5 days.  Symptoms are moderate.  No alleviating or aggravating factors.  No fevers or chills.  No chest pain or shortness of breath.

## 2023-12-29 ENCOUNTER — EMERGENCY (EMERGENCY)
Facility: HOSPITAL | Age: 43
LOS: 0 days | Discharge: HOME | End: 2023-12-29
Attending: EMERGENCY MEDICINE
Payer: MEDICAID

## 2023-12-29 VITALS
TEMPERATURE: 97 F | WEIGHT: 229.94 LBS | OXYGEN SATURATION: 96 % | SYSTOLIC BLOOD PRESSURE: 123 MMHG | HEART RATE: 92 BPM | RESPIRATION RATE: 18 BRPM | DIASTOLIC BLOOD PRESSURE: 69 MMHG

## 2023-12-29 VITALS
HEART RATE: 89 BPM | SYSTOLIC BLOOD PRESSURE: 115 MMHG | OXYGEN SATURATION: 96 % | TEMPERATURE: 99 F | RESPIRATION RATE: 18 BRPM | DIASTOLIC BLOOD PRESSURE: 71 MMHG

## 2023-12-29 DIAGNOSIS — B97.4 RESPIRATORY SYNCYTIAL VIRUS AS THE CAUSE OF DISEASES CLASSIFIED ELSEWHERE: ICD-10-CM

## 2023-12-29 DIAGNOSIS — R05.9 COUGH, UNSPECIFIED: ICD-10-CM

## 2023-12-29 DIAGNOSIS — J06.9 ACUTE UPPER RESPIRATORY INFECTION, UNSPECIFIED: ICD-10-CM

## 2023-12-29 DIAGNOSIS — R68.83 CHILLS (WITHOUT FEVER): ICD-10-CM

## 2023-12-29 PROCEDURE — 71046 X-RAY EXAM CHEST 2 VIEWS: CPT

## 2023-12-29 PROCEDURE — 99284 EMERGENCY DEPT VISIT MOD MDM: CPT

## 2023-12-29 PROCEDURE — 99283 EMERGENCY DEPT VISIT LOW MDM: CPT | Mod: 25

## 2023-12-29 PROCEDURE — 71046 X-RAY EXAM CHEST 2 VIEWS: CPT | Mod: 26

## 2023-12-29 RX ORDER — DEXAMETHASONE 0.5 MG/5ML
2 ELIXIR ORAL
Qty: 4 | Refills: 0
Start: 2023-12-29 | End: 2023-12-30

## 2023-12-29 RX ORDER — CODEINE SULFATE 60 MG/1
1 TABLET ORAL
Qty: 18 | Refills: 0
Start: 2023-12-29 | End: 2023-12-31

## 2023-12-29 RX ORDER — PHENYLEPHRINE/DM/ACETAMINOP/GG 5-10-325MG
2 TABLET ORAL
Qty: 30 | Refills: 0
Start: 2023-12-29 | End: 2024-01-02

## 2023-12-29 RX ORDER — CODEINE SULFATE 60 MG/1
1 TABLET ORAL
Qty: 18 | Refills: 0
Start: 2023-12-29 | End: 2024-01-01

## 2023-12-29 NOTE — ED PROVIDER NOTE - PATIENT PORTAL LINK FT
You can access the FollowMyHealth Patient Portal offered by [facility] by registering at the following website: [email or web address]. By joining [employer]’s FollowMyHealth portal, you will also be able to view your health information using other applications (apps) compatible with our system. You can access the FollowMyHealth Patient Portal offered by [facility] by registering at the following website: [email or web address]. By joining [employer]’s FollowMyHealth portal, you will also be able to view your health information using other applications (apps) compatible with our system.

## 2023-12-29 NOTE — ED PROVIDER NOTE - NSFOLLOWUPINSTRUCTIONS_ED_ALL_ED_FT
Viral Respiratory Infection - RSV    You have a viral infection from RSV and have a lot of cough and congestion. Please follow the following plan:    - Take Codeine as prescribed for cough. Do not drive or operate heavy machinery with this.  - Please  Afrin nasal spray and use 2 sprays in each nostril twice a day for up to one week (no longer)  - Please use oral decongestant that was prescribed. If the pharmacy does not have this in stock, please us Tylenol Severe Cold and Flu        A viral respiratory infection is an illness that affects parts of the body used for breathing, like the lungs, nose, and throat. It is caused by a germ called a virus. Symptoms can include runny nose, coughing, sneezing, fatigue, body aches, sore throat, fever, or headache. Over the counter medicine can be used to manage the symptoms but the infection typically goes away on its own in 5 to 10 days.     SEEK IMMEDIATE MEDICAL CARE IF YOU HAVE ANY OF THE FOLLOWING SYMPTOMS: shortness of breath, chest pain, fever over 10 days, or lightheadedness/dizziness.

## 2023-12-29 NOTE — ED PROVIDER NOTE - ATTENDING APP SHARED VISIT CONTRIBUTION OF CARE
I personally evaluated patient. I agree with the findings and plan with all documentation on chart except as documented  in my note.    42 y/o F no PMHx who returns for continued cough and congestion. Recent diagnosis of RSV. Patient is not short of breath and is eating and drinking normally. Patient has rhinorrhea on exam. VS reviewed. Lungs are clear b/l. CXR done and normal. Will DC with improved decongestant and cough medication.    Full DC instructions discussed and patient knows when to seek immediate medical attention.  Patient has proper follow up.  All results discussed and patient aware they may require further work up.  Proper follow up ensured. Limitations of ED work up discussed.  Medications administered and prescribed/OTC home meds discussed.  All questions and concerns from patient or family addressed. Understanding of instructions verbalized. I personally evaluated patient. I agree with the findings and plan with all documentation on chart except as documented  in my note.    44 y/o F no PMHx who returns for continued cough and congestion. Recent diagnosis of RSV. Patient is not short of breath and is eating and drinking normally. Patient has rhinorrhea on exam. VS reviewed. Lungs are clear b/l. CXR done and normal. Will DC with improved decongestant and cough medication.    Full DC instructions discussed and patient knows when to seek immediate medical attention.  Patient has proper follow up.  All results discussed and patient aware they may require further work up.  Proper follow up ensured. Limitations of ED work up discussed.  Medications administered and prescribed/OTC home meds discussed.  All questions and concerns from patient or family addressed. Understanding of instructions verbalized.

## 2023-12-29 NOTE — ED PROVIDER NOTE - DIFFERENTIAL DIAGNOSIS
Differential Diagnosis The differential diagnosis for patients clinical presentation includes but is not limited to:  pneumonia, URI, PE

## 2023-12-29 NOTE — ED ADULT NURSE NOTE - NSFALLUNIVINTERV_ED_ALL_ED
Bed/Stretcher in lowest position, wheels locked, appropriate side rails in place/Call bell, personal items and telephone in reach/Instruct patient to call for assistance before getting out of bed/chair/stretcher/Non-slip footwear applied when patient is off stretcher/[place] to call system/Physically safe environment - no spills, clutter or unnecessary equipment/Purposeful proactive rounding/Room/bathroom lighting operational, light cord in reach Bed/Stretcher in lowest position, wheels locked, appropriate side rails in place/Call bell, personal items and telephone in reach/Instruct patient to call for assistance before getting out of bed/chair/stretcher/Non-slip footwear applied when patient is off stretcher/[place] to call system/Physically safe environment - no spills, clutter or unnecessary equipment/Purposeful proactive rounding/Room/bathroom lighting operational, light cord in reach

## 2023-12-29 NOTE — ED PROVIDER NOTE - CARE PLAN
Principal Discharge DX:	Respiratory syncytial virus (RSV) infection  Secondary Diagnosis:	URI with cough and congestion   1

## 2023-12-29 NOTE — ED PROVIDER NOTE - CONSIDERATION OF ADMISSION OBSERVATION
CXR clear, VS unremarkable and O2 sat normal. Patient is a good candidate to attempt outpatient management. Supportive care and home care discussed in detail. Patient aware they may have to return for re-evaluation and possible admission if outpatient treatment fails. Strict return precautions discussed. Consideration of Admission/Observation

## 2023-12-29 NOTE — ED PROVIDER NOTE - CLINICAL SUMMARY MEDICAL DECISION MAKING FREE TEXT BOX
44 y/o F no PMHx who returns for continued cough and congestion. Recent diagnosis of RSV. Patient is not short of breath and is eating and drinking normally. Patient has rhinorrhea on exam. VS reviewed. Lungs are clear b/l. CXR done and normal. Will DC with improved decongestant and cough medication.    Full DC instructions discussed and patient knows when to seek immediate medical attention.  Patient has proper follow up.  All results discussed and patient aware they may require further work up.  Proper follow up ensured. Limitations of ED work up discussed.  Medications administered and prescribed/OTC home meds discussed.  All questions and concerns from patient or family addressed. Understanding of instructions verbalized. 42 y/o F no PMHx who returns for continued cough and congestion. Recent diagnosis of RSV. Patient is not short of breath and is eating and drinking normally. Patient has rhinorrhea on exam. VS reviewed. Lungs are clear b/l. CXR done and normal. Will DC with improved decongestant and cough medication.    Full DC instructions discussed and patient knows when to seek immediate medical attention.  Patient has proper follow up.  All results discussed and patient aware they may require further work up.  Proper follow up ensured. Limitations of ED work up discussed.  Medications administered and prescribed/OTC home meds discussed.  All questions and concerns from patient or family addressed. Understanding of instructions verbalized.

## 2023-12-30 RX ORDER — CODEINE SULFATE 60 MG/1
1 TABLET ORAL
Qty: 18 | Refills: 0
Start: 2023-12-30 | End: 2024-01-01

## 2024-07-31 NOTE — ED ADULT NURSE NOTE - NS ED NURSE RECORD ANOTHER VITAL SIGN
----- Message from Evelyn Sims sent at 7/31/2024  9:27 AM CDT -----  Pt calling because she missed her appt in April and is feeling fatigued and tired. She is wanting to know if she goes and has labs done, can something be done now or does she have to see him first 08/29?     [phone]  
Message concerning labs sent to patient via portal.   
Yes
